# Patient Record
Sex: MALE | Race: WHITE | NOT HISPANIC OR LATINO | Employment: OTHER | URBAN - METROPOLITAN AREA
[De-identification: names, ages, dates, MRNs, and addresses within clinical notes are randomized per-mention and may not be internally consistent; named-entity substitution may affect disease eponyms.]

---

## 2020-06-12 ENCOUNTER — TELEPHONE (OUTPATIENT)
Dept: GASTROENTEROLOGY | Facility: CLINIC | Age: 75
End: 2020-06-12

## 2020-08-12 ENCOUNTER — CLINICAL SUPPORT (OUTPATIENT)
Dept: GASTROENTEROLOGY | Facility: CLINIC | Age: 75
End: 2020-08-12

## 2020-08-12 VITALS — BODY MASS INDEX: 33.18 KG/M2 | HEIGHT: 72 IN | WEIGHT: 245 LBS

## 2020-08-12 DIAGNOSIS — Z86.010 HISTORY OF COLON POLYPS: Primary | ICD-10-CM

## 2020-08-12 RX ORDER — ZOLPIDEM TARTRATE 10 MG/1
10 TABLET ORAL
COMMUNITY
Start: 2020-05-05

## 2020-08-12 RX ORDER — ASCORBIC ACID 1000 MG
TABLET, EXTENDED RELEASE ORAL DAILY
COMMUNITY

## 2020-08-12 RX ORDER — MELOXICAM 15 MG/1
15 TABLET ORAL 2 TIMES DAILY
COMMUNITY
Start: 2020-06-11

## 2020-08-12 RX ORDER — LOSARTAN POTASSIUM 50 MG/1
50 TABLET ORAL
COMMUNITY

## 2020-08-12 RX ORDER — CARBAMAZEPINE 200 MG/1
100 TABLET ORAL 2 TIMES DAILY
COMMUNITY
Start: 2020-07-21

## 2020-08-12 RX ORDER — SODIUM, POTASSIUM,MAG SULFATES 17.5-3.13G
SOLUTION, RECONSTITUTED, ORAL ORAL
Qty: 2 BOTTLE | Refills: 0 | Status: SHIPPED | OUTPATIENT
Start: 2020-08-12 | End: 2020-08-19 | Stop reason: HOSPADM

## 2020-08-12 RX ORDER — PREGABALIN 100 MG/1
100 CAPSULE ORAL 2 TIMES DAILY
COMMUNITY
Start: 2020-07-21

## 2020-08-12 RX ORDER — HYDROCODONE BITARTRATE AND ACETAMINOPHEN 5; 325 MG/1; MG/1
1-2 TABLET ORAL EVERY 6 HOURS PRN
COMMUNITY

## 2020-08-12 RX ORDER — ROPINIROLE 0.25 MG/1
0.25 TABLET, FILM COATED ORAL
COMMUNITY
Start: 2020-06-05

## 2020-08-12 RX ORDER — GABAPENTIN 300 MG/1
300 CAPSULE ORAL 3 TIMES DAILY
COMMUNITY

## 2020-08-12 NOTE — PROGRESS NOTES
Phone prep for colon dx history of polyps  instructions given for suprep rx sent and emailed to patient

## 2020-08-17 ENCOUNTER — TELEPHONE (OUTPATIENT)
Dept: GASTROENTEROLOGY | Facility: CLINIC | Age: 75
End: 2020-08-17

## 2020-08-17 NOTE — TELEPHONE ENCOUNTER
Pt called through the service to return phone call from [address] [place] [address] on 8/14   [facility]  [phone]

## 2020-08-19 ENCOUNTER — ANESTHESIA EVENT (OUTPATIENT)
Dept: GASTROENTEROLOGY | Facility: AMBULATORY SURGERY CENTER | Age: 75
End: 2020-08-19

## 2020-08-19 ENCOUNTER — HOSPITAL ENCOUNTER (OUTPATIENT)
Dept: GASTROENTEROLOGY | Facility: AMBULATORY SURGERY CENTER | Age: 75
Discharge: HOME | End: 2020-08-19
Payer: COMMERCIAL

## 2020-08-19 ENCOUNTER — ANESTHESIA (OUTPATIENT)
Dept: GASTROENTEROLOGY | Facility: AMBULATORY SURGERY CENTER | Age: 75
End: 2020-08-19

## 2020-08-19 VITALS
TEMPERATURE: 97.7 F | RESPIRATION RATE: 12 BRPM | DIASTOLIC BLOOD PRESSURE: 70 MMHG | SYSTOLIC BLOOD PRESSURE: 123 MMHG | OXYGEN SATURATION: 100 % | HEART RATE: 58 BPM

## 2020-08-19 DIAGNOSIS — Z86.010 HISTORY OF COLON POLYPS: ICD-10-CM

## 2020-08-19 PROBLEM — M54.9 BACK PAIN: Status: ACTIVE | Noted: 2020-08-19

## 2020-08-19 PROBLEM — C67.9 BLADDER CANCER (HCC): Status: ACTIVE | Noted: 2020-08-19

## 2020-08-19 PROCEDURE — 45380 COLONOSCOPY AND BIOPSY: CPT | Performed by: INTERNAL MEDICINE

## 2020-08-19 PROCEDURE — 88305 TISSUE EXAM BY PATHOLOGIST: CPT | Performed by: PATHOLOGY

## 2020-08-19 RX ORDER — SODIUM CHLORIDE 9 MG/ML
50 INJECTION, SOLUTION INTRAVENOUS CONTINUOUS
Status: DISCONTINUED | OUTPATIENT
Start: 2020-08-19 | End: 2020-08-23 | Stop reason: HOSPADM

## 2020-08-19 RX ORDER — PROPOFOL 10 MG/ML
INJECTION, EMULSION INTRAVENOUS AS NEEDED
Status: DISCONTINUED | OUTPATIENT
Start: 2020-08-19 | End: 2020-08-19

## 2020-08-19 RX ADMIN — SODIUM CHLORIDE 50 ML/HR: 9 INJECTION, SOLUTION INTRAVENOUS at 08:24

## 2020-08-19 RX ADMIN — PROPOFOL 40 MG: 10 INJECTION, EMULSION INTRAVENOUS at 08:42

## 2020-08-19 RX ADMIN — PROPOFOL 40 MG: 10 INJECTION, EMULSION INTRAVENOUS at 08:48

## 2020-08-19 RX ADMIN — PROPOFOL 40 MG: 10 INJECTION, EMULSION INTRAVENOUS at 08:40

## 2020-08-19 RX ADMIN — PROPOFOL 80 MG: 10 INJECTION, EMULSION INTRAVENOUS at 08:38

## 2020-08-19 NOTE — ANESTHESIA PREPROCEDURE EVALUATION
Procedure:  COLONOSCOPY    Relevant Problems   MUSCULOSKELETAL   (+) Back pain        Physical Exam    Airway    Mallampati score: I  TM Distance: >3 FB  Neck ROM: full     Dental       Cardiovascular  Cardiovascular exam normal    Pulmonary  Pulmonary exam normal     Other Findings        Anesthesia Plan  ASA Score- 2     Anesthesia Type- IV sedation with anesthesia with ASA Monitors  Additional Monitors:   Airway Plan:           Plan Factors-    Chart reviewed  Patient is not a current smoker  Induction- intravenous  Postoperative Plan-     Informed Consent- Anesthetic plan and risks discussed with patient

## 2020-08-19 NOTE — H&P
History and Physical - [facility]  Zoraida Parr 69 y o  male MRN: [id number]    HPI: Zoraida Parr is a 69y o  year old male who presents for personal history of colon polyps    REVIEW OF SYSTEMS: Per the HPI, and otherwise unremarkable      Historical Information   Past Medical History:   Diagnosis Date    [facility]     Colon polyp     Hypertension      Past Surgical History:   Procedure Laterality Date    ANKLE ARTHROPLASTY N/A     COLONOSCOPY      CYSTOSCOPY      HERNIA REPAIR      LAMINECTOMY AND MICRODISCECTOMY LUMBAR SPINE      MENISCECTOMY      PROSTATECTOMY N/A      Social History   Social History     Substance and Sexual Activity   Alcohol Use Yes     Social History     Substance and Sexual Activity   Drug Use Not on file     Social History     Tobacco Use   Smoking Status Never Smoker   Smokeless Tobacco Never Used     Family History   Problem Relation Age of Onset    Breast cancer additional onset Sister    [employer] Cancer Brother        Meds/Allergies       Current Outpatient Medications:     Ascorbic Acid (Vitamin C ER) 1000 MG TBCR    Aspirin Buf,CaCarb-MgCarb-MgO, 81 MG TABS    carBAMazepine (TEGretol) 200 mg tablet    gabapentin (NEURONTIN) 300 mg capsule    HYDROcodone-acetaminophen (NORCO) 5-325 mg per tablet    losartan (COZAAR) 50 mg tablet    meloxicam (MOBIC) 15 mg tablet    methotrexate 2 5 mg tablet    Na Sulfate-K Sulfate-Mg Sulf (Suprep Bowel Prep Kit) 17 5-3 13-1 6 GM/177ML SOLN    pregabalin (LYRICA) 100 mg capsule    rOPINIRole (REQUIP) 0 25 mg tablet    zolpidem (AMBIEN) 10 mg tablet    Current Facility-Administered Medications:     sodium chloride 0 9 % infusion, 50 mL/hr, Intravenous, Continuous    Allergies   Allergen Reactions    Penicillins        Objective     /66   Pulse (!) 53   Temp 97 7 °F (36 5 °C) (Temporal)   Resp 13   SpO2 100%     PHYSICAL EXAM    Gen: NAD AAOx3  CV: S1S2 RRR no m/r/g  CHEST: Clear b/l no c/r/w  ABD: soft, +BS NT/ND  EXT: no edema    ASSESSMENT/PLAN:  This is a 69y o  year old male here for colonoscopy, and he is stable and optimized for his procedure

## 2020-08-19 NOTE — DISCHARGE INSTRUCTIONS
Colorectal Polyps   WHAT YOU NEED TO KNOW:   What are colorectal polyps? Colorectal polyps are small growths of tissue in the lining of the colon and rectum  Most polyps are hyperplastic polyps and are usually benign (noncancerous)  Certain types of polyps, called adenomatous polyps, may turn into cancer  What increases my risk of colorectal polyps? The exact cause of colorectal polyps is unknown  The following may increase your risk:  · Older age    · A diet of foods high in fat and low in fiber     · Family history of polyps    · Intestinal diseases, such as Crohn's disease or ulcerative colitis    · An unhealthy lifestyle, such as physical inactivity, smoking, or drinking alcohol    · Obesity  What are the signs and symptoms of colorectal polyps? · Blood in your bowel movement or bleeding from the rectum    · Change in bowel movement habits, such as diarrhea and constipation    · Abdominal pain  How are colorectal polyps diagnosed? You should have fecal blood screening once a year for colorectal disease if you are over 48years old  You should be screened earlier if you have an intestinal disease or a family history of polyps or colorectal cancer  During this screening, a sample of your bowel movement is checked for blood, which may be an early sign of colorectal polyps or cancer  You may also need any of the following tests:  · Digital rectal exam:  Your healthcare provider will examine your anus and use a finger to check your rectum for polyps  · Barium enema: A barium enema is an x-ray of the colon  A tube is put into your anus, and a liquid called barium is put through the tube  Barium is used so that caregivers can see your colon better on the x-ray film  · Virtual colonoscopy: This is a CT scan that takes pictures of the inside of your colon and rectum  A small, flexible tube is put into your rectum and air or carbon dioxide (gas) is used to expand your colon   This lets healthcare providers clearly see your colon and any polyps on a monitor  · Colonoscopy or sigmoidoscopy: These procedures help your healthcare provider see the inside of your colon using a flexible tube with a small light and camera on the end  During a sigmoidoscopy, your healthcare provider will only look at rectum and lower colon  During a colonoscopy, healthcare providers will look at the full length of your colon  Healthcare providers may remove a small amount of tissue from the colon for a biopsy  How are colorectal polyps treated? · Polyp removal:  Polyps may be removed during your sigmoidoscopy or colonoscopy  · Polypectomy: This is surgery to remove your polyps  You may need laparoscopic or open surgery, depending on the type, size, and number of polyps that you have  Laparoscopy is done by inserting a small, flexible scope into incisions made on your abdomen  Open surgery is done by making a larger incision on your abdomen   What are the risks of colorectal polyps? You may bleed during a colonoscopy procedure  Your bowel may be perforated (torn) when polyps are removed  This may lead to an open abdominal surgery  During surgery, you may bleed too much or get an infection  Adenomatous polyps that are not removed may turn into cancer and become more difficult to treat  Where can I find support and more information? · Imer Stevens ([facility])  [address], [place] [address]  Phone: [phone]  Web Address: Rosalina Frank  [facility] nih gov  When should I contact my healthcare provider? · You have a fever  · You have chills, a cough, or feel weak and achy  · You have abdominal pain that does not go away or gets worse after you take medicine  · Your abdomen is swollen  · You are losing weight without trying  · You have questions or concerns about your condition or care  When should I seek immediate care or call 911?    · You have sudden shortness of breath  · You have a fast heart rate, fast breathing, or are too dizzy to stand up  · You have severe abdominal pain  · You see blood in your bowel movement  CARE AGREEMENT:   You have the right to help plan your care  Learn about your health condition and how it may be treated  Discuss treatment options with your caregivers to decide what care you want to receive  You always have the right to refuse treatment  The above information is an  only  It is not intended as medical advice for individual conditions or treatments  Talk to your doctor, nurse or pharmacist before following any medical regimen to see if it is safe and effective for you  © 2017 [facility] Information is for End User's use only and may not be sold, redistributed or otherwise used for commercial purposes  All illustrations and images included in CareNotes® are the copyrighted property of A D A M , Inc  or Og Parrish

## 2020-08-19 NOTE — ANESTHESIA POSTPROCEDURE EVALUATION
Post-Op Assessment Note    CV Status:  Stable  Pain Score: 0    Pain management: adequate     Mental Status:  Alert and awake   Hydration Status:  Euvolemic   PONV Controlled:  Controlled   Airway Patency:  Patent      Post Op Vitals Reviewed: Yes      Staff: Anesthesiologist         No complications documented      BP      Temp      Pulse     Resp      SpO2

## 2020-12-16 ENCOUNTER — TELEMEDICINE (OUTPATIENT)
Dept: GASTROENTEROLOGY | Facility: CLINIC | Age: 75
End: 2020-12-16
Payer: COMMERCIAL

## 2020-12-16 VITALS — WEIGHT: 240 LBS | HEIGHT: 72 IN | BODY MASS INDEX: 32.51 KG/M2

## 2020-12-16 DIAGNOSIS — R19.5 HEME POSITIVE STOOL: ICD-10-CM

## 2020-12-16 DIAGNOSIS — D64.9 ANEMIA, UNSPECIFIED TYPE: Primary | ICD-10-CM

## 2020-12-16 DIAGNOSIS — Z86.010 HISTORY OF COLON POLYPS: ICD-10-CM

## 2020-12-16 PROBLEM — Z86.0100 HISTORY OF COLON POLYPS: Status: ACTIVE | Noted: 2020-12-16

## 2020-12-16 PROCEDURE — 99214 OFFICE O/P EST MOD 30 MIN: CPT | Performed by: INTERNAL MEDICINE

## 2020-12-16 RX ORDER — FOLIC ACID 1 MG/1
TABLET ORAL DAILY
COMMUNITY

## 2020-12-16 RX ORDER — CLINDAMYCIN HYDROCHLORIDE 150 MG/1
150 CAPSULE ORAL
COMMUNITY

## 2020-12-16 RX ORDER — PREDNISONE 20 MG/1
10 TABLET ORAL DAILY
COMMUNITY
Start: 2020-10-12 | End: 2020-12-30

## 2020-12-16 NOTE — H&P (VIEW-ONLY)
Virtual Regular Visit      Assessment/Plan:    Problem List Items Addressed This Visit        Other    History of colon polyps    Heme positive stool    Anemia - Primary    Relevant Medications    folic acid (FOLVITE) 1 mg tablet        Anemia/Hemoccult-positive stools - patient denies any obvious GI bleeding with no melena hematochezia or hematemesis  He states he did have an adverse reaction of diarrhea while receiving infusions to treat his prostate cancer but no bleeding  He is up-to-date with his surveillance for colonic polyps with a colonoscopy just 4 months ago, only 2 small polyps were identified and removed  Although he has no significant upper GI symptoms he is on prednisone and Mobic, erosive gastritis or peptic ulcer disease should be excluded  There certainly may be a multifactorial cause with not just GI blood loss but anemia secondary to his underlying chronic diseases and treatment for his malignancy  · Continue to trend hemoglobin and transfuse as needed  · Schedule EGD to exclude upper GI source of blood loss  · If EGD is negative would plan capsule endoscopy  · No clear indication to repeat colonoscopy presently given recent exam     Personal history of colonic polyps - multiple adenomatous polyps in the past and 2 small ones on recent colonoscopy  Unless repeat colonoscopy is needed to evaluate bleeding or symptoms surveillance every 5 years is recommended  Reason for visit is   Chief Complaint   Patient presents with    Low Hgb     referred by HEBER Peraza    Positive hemoccult    Virtual Regular Visit        Encounter provider Alec Cook MD    Provider located at [address] [address] [phone]      Recent Visits  No visits were found meeting these conditions     Showing recent visits within past 7 days and meeting all other requirements     Today's Visits  Date Type Provider Dept   12/16/20 Telemedicine Rosales Alamo MD [facility]   Showing today's visits and meeting all other requirements     Future Appointments  No visits were found meeting these conditions  Showing future appointments within next 150 days and meeting all other requirements        The patient was identified by name and date of birth  Paul Dave was informed that this is a telemedicine visit and that the visit is being conducted through [employer] and patient was informed that this is a secure, HIPAA-compliant platform  He agrees to proceed     My office door was closed  No one else was in the room  He acknowledged consent and understanding of privacy and security of the video platform  The patient has agreed to participate and understands they can discontinue the visit at any time  Patient is aware this is a billable service  Subjective  Paul Dave is a 70 y o  male with anemia and Hemoccult-positive stools  HPI   Had stool test that was heme positive  Found to be anemic  Getting IV therapy for bladder CA and had diarrhea  Started in September through November, stools mostly back to normal   Adjusting diet  Feeling OK now  Stool once or twice a day  No blood or melena  No abdominal pain, GERD, dyspepsia, nausea or vomiting  On MTX, prednisone and Mobic  Had surveillance colonoscopy just this August   Two small adenomatous polyp seen, no other lesions  Past hx of prostate CA, YFN  Past Medical History:   Diagnosis Date    Cancer [facility])     Colon polyp     Hypertension        Past Surgical History:   Procedure Laterality Date    ANKLE ARTHROPLASTY N/A     COLONOSCOPY      August 2020: 2 small adenomatous polyps    May 2017:  Multiple adenomatous polyps throughout the colon, internal hemorrhoids,    CYSTOSCOPY      HERNIA REPAIR      LAMINECTOMY AND MICRODISCECTOMY LUMBAR SPINE      MENISCECTOMY      PROSTATECTOMY N/A        Current Outpatient Medications Medication Sig Dispense Refill    Ascorbic Acid (Vitamin C ER) 1000 MG TBCR Take by mouth daily      Aspirin Buf,CaCarb-MgCarb-MgO, 81 MG TABS Take 81 mg by mouth daily      carBAMazepine (TEGretol) 200 mg tablet Take 100 mg by mouth 2 (two) times a day 1/2 tab in at dinner; 1 tab hs      clindamycin (CLEOCIN) 150 mg capsule Take 150 mg by mouth Prior to dental procedures      folic acid (FOLVITE) 1 mg tablet Take by mouth daily      gabapentin (NEURONTIN) 300 mg capsule Take 300 mg by mouth 3 (three) times a day      HYDROcodone-acetaminophen (NORCO) 5-325 mg per tablet Take 1-2 tablets by mouth every 6 (six) hours as needed       losartan (COZAAR) 50 mg tablet Take 50 mg by mouth      methotrexate 2 5 mg tablet Take 6 25 mg by mouth every 7 days      predniSONE 20 mg tablet Take 10 mg by mouth daily      pregabalin (LYRICA) 100 mg capsule Take 100 mg by mouth 2 (two) times a day 1 tab in AM 2 tabs in PM      rOPINIRole (REQUIP) 0 25 mg tablet Take 0 25 mg by mouth daily at bedtime      zolpidem (AMBIEN) 10 mg tablet Take 10 mg by mouth daily at bedtime as needed       meloxicam (MOBIC) 15 mg tablet Take 15 mg by mouth 2 (two) times a day       No current facility-administered medications for this visit  Allergies   Allergen Reactions    Penicillins        Review of Systems    Video Exam    Vitals:    12/16/20 1111   Weight: 109 kg (240 lb)   Height: 6' (1 829 m)       Physical Exam     I spent 24 minutes directly with the patient during this visit      VIRTUAL VISIT DISCLAIMER    Nadiya Black acknowledges that he has consented to an online visit or consultation  He understands that the online visit is based solely on information provided by him, and that, in the absence of a face-to-face physical evaluation by the physician, the diagnosis he receives is both limited and provisional in terms of accuracy and completeness   This is not intended to replace a full medical face-to-face evaluation by the physician  Alejandra Delgadillo understands and accepts these terms

## 2020-12-18 ENCOUNTER — TELEPHONE (OUTPATIENT)
Dept: GASTROENTEROLOGY | Facility: CLINIC | Age: 75
End: 2020-12-18

## 2020-12-30 ENCOUNTER — ANESTHESIA (OUTPATIENT)
Dept: GASTROENTEROLOGY | Facility: AMBULATORY SURGERY CENTER | Age: 75
End: 2020-12-30

## 2020-12-30 ENCOUNTER — ANESTHESIA EVENT (OUTPATIENT)
Dept: GASTROENTEROLOGY | Facility: AMBULATORY SURGERY CENTER | Age: 75
End: 2020-12-30

## 2020-12-30 ENCOUNTER — HOSPITAL ENCOUNTER (OUTPATIENT)
Dept: GASTROENTEROLOGY | Facility: AMBULATORY SURGERY CENTER | Age: 75
Discharge: HOME | End: 2020-12-30
Payer: COMMERCIAL

## 2020-12-30 VITALS — HEART RATE: 64 BPM

## 2020-12-30 VITALS
BODY MASS INDEX: 32.55 KG/M2 | RESPIRATION RATE: 23 BRPM | HEART RATE: 55 BPM | TEMPERATURE: 98.7 F | HEIGHT: 72 IN | SYSTOLIC BLOOD PRESSURE: 125 MMHG | DIASTOLIC BLOOD PRESSURE: 64 MMHG | OXYGEN SATURATION: 99 %

## 2020-12-30 DIAGNOSIS — D64.9 ANEMIA, UNSPECIFIED TYPE: ICD-10-CM

## 2020-12-30 PROBLEM — I10 HTN (HYPERTENSION): Status: ACTIVE | Noted: 2020-12-30

## 2020-12-30 PROCEDURE — 43235 EGD DIAGNOSTIC BRUSH WASH: CPT | Performed by: INTERNAL MEDICINE

## 2020-12-30 RX ORDER — PROPOFOL 10 MG/ML
INJECTION, EMULSION INTRAVENOUS AS NEEDED
Status: DISCONTINUED | OUTPATIENT
Start: 2020-12-30 | End: 2020-12-30

## 2020-12-30 RX ORDER — DIPHENOXYLATE HYDROCHLORIDE AND ATROPINE SULFATE 2.5; .025 MG/1; MG/1
1 TABLET ORAL DAILY
COMMUNITY

## 2020-12-30 RX ORDER — SODIUM CHLORIDE 9 MG/ML
50 INJECTION, SOLUTION INTRAVENOUS CONTINUOUS
Status: DISCONTINUED | OUTPATIENT
Start: 2020-12-30 | End: 2021-01-03 | Stop reason: HOSPADM

## 2020-12-30 RX ADMIN — SODIUM CHLORIDE 50 ML/HR: 9 INJECTION, SOLUTION INTRAVENOUS at 13:00

## 2020-12-30 RX ADMIN — PROPOFOL 30 MG: 10 INJECTION, EMULSION INTRAVENOUS at 13:30

## 2020-12-30 RX ADMIN — PROPOFOL 50 MG: 10 INJECTION, EMULSION INTRAVENOUS at 13:28

## 2020-12-30 RX ADMIN — PROPOFOL 100 MG: 10 INJECTION, EMULSION INTRAVENOUS at 13:25

## 2020-12-30 NOTE — DISCHARGE INSTRUCTIONS
Capsule Endoscopy   WHAT YOU NEED TO KNOW:   What do I need to know about a capsule endoscopy? Capsule endoscopy is a procedure to take pictures of the inside of your small bowel (intestine)  The pictures may show if you have growths, swelling, or bleeding areas in your intestine  You may need this procedure if you have symptoms such as blood in your bowel movements or chronic stomach pain  How do I prepare for my capsule endoscopy? You may need medicine to help clean out your intestines or decrease air bubbles  Ask about directions for eating and drinking before your procedure  What will happen during my capsule endoscopy? · Small sensors will be taped to the skin on your abdomen and connected to a recorder  The sensors transfer the pictures of your small bowel to the recorder  The recorder will be attached to a belt that you wear during the procedure  You will be given the pill-sized capsule endoscope to swallow  The capsule will travel through your body the same way your food does and take 2 to 3 pictures every second  · You will be able to go home  Your healthcare provider will tell you when to return  Two hours after you swallow the capsule, you may be able to drink liquids and take medicines  Four hours after you swallow the capsule, you may also be able to eat a small meal  It will take up to 8 hours for the capsule to pass through your small bowel  Your healthcare provider will remove the sensors and recorder when the procedure is complete  What will happen after my capsule endoscopy? The capsule will come out in your bowel movement within 2 days  You do not need to return the capsule  A healthcare provider will view the pictures of your small bowel and look for any problems  What are the risks of a capsule endoscopy? · You may have stomach pain during your procedure  The pictures taken by the capsule may not be clear  The pictures may not show the cause of your symptoms   You may need another endoscopy procedure  The capsule may get trapped in your body if your intestines are narrow or blocked  You may need surgery to remove the capsule  · Without the procedure, you may not learn the cause of your symptoms  Your symptoms may get worse  You may not get the treatment you need  CARE AGREEMENT:   You have the right to help plan your care  Learn about your health condition and how it may be treated  Discuss treatment options with your caregivers to decide what care you want to receive  You always have the right to refuse treatment  The above information is an  only  It is not intended as medical advice for individual conditions or treatments  Talk to your doctor, nurse or pharmacist before following any medical regimen to see if it is safe and effective for you  © 2017 [address] [employer] is for End User's use only and may not be sold, redistributed or otherwise used for commercial purposes  All illustrations and images included in CareNotes® are the copyrighted property of A D A M , Inc  or Og Parrish  Gastritis   WHAT YOU NEED TO KNOW:   What is gastritis? Gastritis is inflammation or irritation of the lining of your stomach  What increases my risk for gastritis? · Infection with bacteria, a virus, or a parasite    · NSAIDs, aspirin, or steroid medicine    · Use of tobacco products or alcohol    · Trauma such as an injury to your stomach or intestine    · Autoimmune disorders such as diabetes, thyroid disease, or Crohn disease    · Stress    · Age older than 60 years    · Illegal drugs, such as cocaine    What are the signs and symptoms of gastritis? · Stomach pain, burning, or tenderness when you press on it    · Stomach fullness or tightness    · Nausea or vomiting    · Loss of appetite, or feeling full quickly when you eat    · Bad breath    · Fatigue or feeling more tired than usual    · Heartburn    How is gastritis diagnosed? Your healthcare provider will ask about your signs and symptoms and examine you  You may need any of the following:  · Blood tests  may be used to show an infection, dehydration, or anemia (low red blood cell levels)  · A bowel movement sample  may be tested for blood or the germ that may be causing your gastritis  · A breath test  may show if H pylori is causing your gastritis  You will be given a liquid to drink  Then you will breathe into a bag  Your healthcare provider will measure the amount of carbon dioxide in your breath  Extra amounts of carbon dioxide may mean you have an H pylori infection  · An endoscopy  may be used to look for irritation or bleeding in your stomach  Your healthcare provider will use an endoscope (tube with a light and camera on the end) during the procedure  He or she may take a sample from your stomach to be tested  How is gastritis treated? Your symptoms may go away without treatment  Treatment will depend on what is causing your gastritis  Your healthcare provider may recommend changes to the medicines you take  Medicines may be given to help treat a bacterial infection or decrease stomach acid  How can I manage or prevent gastritis? · Do not smoke  Nicotine and other chemicals in cigarettes and cigars can make your symptoms worse and cause lung damage  Ask your healthcare provider for information if you currently smoke and need help to quit  E-cigarettes or smokeless tobacco still contain nicotine  Talk to your healthcare provider before you use these products  · Do not drink alcohol  Alcohol can prevent healing and make your gastritis worse  Talk to your healthcare provider if you need help to stop drinking  · Do not take NSAIDs or aspirin unless directed  These and similar medicines can cause irritation of your stomach lining  If your healthcare provider says it is okay to take NSAIDs, take them with food  · Do not eat foods that cause irritation  Foods such as oranges and salsa can cause burning or pain  Eat a variety of healthy foods  Examples include fruits (not citrus), vegetables, low-fat dairy products, beans, whole-grain breads, and lean meats and fish  Try to eat small meals, and drink water with your meals  Do not eat for at least 3 hours before you go to bed  · Find ways to relax and decrease stress  Stress can increase stomach acid and make gastritis worse  Activities such as yoga, meditation, or listening to music can help you relax  Spend time with friends, or do things you enjoy  Call 911 for any of the following:   · You develop chest pain or shortness of breath  When should I seek immediate care? · You vomit blood  · You have black or bloody bowel movements  · You have severe stomach or back pain  When should I contact my healthcare provider? · You have a fever  · You have new or worsening symptoms, even after treatment  · You have questions or concerns about your condition or care  CARE AGREEMENT:   You have the right to help plan your care  Learn about your health condition and how it may be treated  Discuss treatment options with your healthcare providers to decide what care you want to receive  You always have the right to refuse treatment  The above information is an  only  It is not intended as medical advice for individual conditions or treatments  Talk to your doctor, nurse or pharmacist before following any medical regimen to see if it is safe and effective for you  © Copyright [address] Information is for End User's use only and may not be sold, redistributed or otherwise used for commercial purposes   All illustrations and images included in CareNotes® are the copyrighted property of [employer]  or [address] [place] [address]

## 2020-12-30 NOTE — INTERVAL H&P NOTE
H&P reviewed  After examining the patient I find no changes in the patients condition since the H&P had been written      Vitals:    12/30/20 1248   BP: 143/91   Pulse: 65   Resp: 15   Temp: 98 7 °F (37 1 °C)   SpO2: 100%

## 2021-01-05 ENCOUNTER — TELEPHONE (OUTPATIENT)
Dept: GASTROENTEROLOGY | Facility: CLINIC | Age: 76
End: 2021-01-05

## 2021-01-19 NOTE — TELEPHONE ENCOUNTER
Spoke to insurance   1/19/2021 @9:23 am Bennett Ref # [id number]  No PA needed for CPT 75453 as long as we are participating provider

## 2021-01-19 NOTE — TELEPHONE ENCOUNTER
Spoke with patient  States he is having back surgery next week and will have brace for about 4 weeks and wishes to postpone until after surgery  I will set up a reminder to contact him in March 2021

## 2021-03-16 NOTE — TELEPHONE ENCOUNTER
Spoke with patient  Scheduled for 4/14/2021  Emailed instructions to patient  Will postpone sending prep until 4/6/2021 which is closer to appt

## 2021-04-07 NOTE — TELEPHONE ENCOUNTER
Pt left  mss stating he has Colace and Senna Plus/wants to know if it's OK to use that for capsule instead of Dulcolax and Miralax? [phone]

## 2021-04-13 DIAGNOSIS — Z23 ENCOUNTER FOR IMMUNIZATION: ICD-10-CM

## 2021-04-14 ENCOUNTER — OFFICE VISIT (OUTPATIENT)
Dept: GASTROENTEROLOGY | Facility: CLINIC | Age: 76
End: 2021-04-14
Payer: COMMERCIAL

## 2021-04-14 DIAGNOSIS — D50.9 IRON DEFICIENCY ANEMIA, UNSPECIFIED IRON DEFICIENCY ANEMIA TYPE: ICD-10-CM

## 2021-04-14 PROCEDURE — 91110 GI TRC IMG INTRAL ESOPH-ILE: CPT | Performed by: INTERNAL MEDICINE

## 2021-04-14 NOTE — PROGRESS NOTES
Patient here for capsule endoscopy  Tolerated bowel prep  Patient reports he took a BP med at 4:00 am   Reviewed medication allergies with patient  Simethicone 20 mg instilled in water to swallow pill cam   0 6 ml  Lot # [id number] expires 09/2022  Reviewed capsule equipment and instructions  Patient verbalized understanding    Patient to return at 4:30 PM

## 2021-04-14 NOTE — LETTER
April 19, 2021     Shayla Guo DO  [address] [facility]    [facility] [address]    Patient: Jordan Zhao   YOB: 1945   Date of Visit: 4/14/2021       Dear Dr Harp Rule: Thank you for referring Jordan Zhao to me for evaluation  Below are my notes for this consultation  If you have questions, please do not hesitate to call me  I look forward to following your patient along with you  Sincerely,        Rishi Campos        CC: No Recipients  Mary Alice Champion MD  4/19/2021 12:37 PM  Signed   Called patient with results from capsule endoscopy performed on April 14, 2021  Indication:  Iron deficiency anemia  Findings:  Antral gastritis  Otherwise normal small bowel mucosa  Recommendations:  No source of anemia found the small bowel capsule study  Follow-up with Hematology and oncologist for routine blood work and monitor the hemoglobin given negative GI workup

## 2021-04-19 ENCOUNTER — TELEPHONE (OUTPATIENT)
Dept: GASTROENTEROLOGY | Facility: CLINIC | Age: 76
End: 2021-04-19

## 2021-04-19 NOTE — PROGRESS NOTES
Called patient with results from capsule endoscopy performed on April 14, 2021  Indication:  Iron deficiency anemia  Findings:  Antral gastritis  Otherwise normal small bowel mucosa  Recommendations:  No source of anemia found the small bowel capsule study  Follow-up with Hematology and oncologist for routine blood work and monitor the hemoglobin given negative GI workup